# Patient Record
Sex: FEMALE | Race: WHITE | NOT HISPANIC OR LATINO
[De-identification: names, ages, dates, MRNs, and addresses within clinical notes are randomized per-mention and may not be internally consistent; named-entity substitution may affect disease eponyms.]

---
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
## 2021-11-04 ENCOUNTER — OFFICE VISIT (OUTPATIENT)
Dept: OTOLARYNGOLOGY | Facility: CLINIC | Age: 21
End: 2021-11-04
Payer: COMMERCIAL

## 2021-11-04 ENCOUNTER — OFFICE VISIT (OUTPATIENT)
Dept: AUDIOLOGY | Facility: CLINIC | Age: 21
End: 2021-11-04
Payer: COMMERCIAL

## 2021-11-04 VITALS — HEIGHT: 64 IN | WEIGHT: 130 LBS | BODY MASS INDEX: 22.2 KG/M2

## 2021-11-04 DIAGNOSIS — H90.11 CONDUCTIVE HEARING LOSS OF RIGHT EAR WITH UNRESTRICTED HEARING OF LEFT EAR: Primary | ICD-10-CM

## 2021-11-04 DIAGNOSIS — H74.03 TYMPANOSCLEROSIS OF BOTH EARS: ICD-10-CM

## 2021-11-04 PROCEDURE — 69420 INCISION OF EARDRUM: CPT | Performed by: OTOLARYNGOLOGY

## 2021-11-04 PROCEDURE — 92557 COMPREHENSIVE HEARING TEST: CPT | Performed by: AUDIOLOGIST

## 2021-11-04 PROCEDURE — 92567 TYMPANOMETRY: CPT | Performed by: AUDIOLOGIST

## 2021-11-04 PROCEDURE — 99203 OFFICE O/P NEW LOW 30 MIN: CPT | Performed by: OTOLARYNGOLOGY

## 2021-11-04 PROCEDURE — 3008F BODY MASS INDEX DOCD: CPT | Performed by: OTOLARYNGOLOGY

## 2021-11-04 RX ORDER — FLUTICASONE PROPIONATE 50 MCG
2 SPRAY, SUSPENSION (ML) NASAL DAILY
COMMUNITY
Start: 2021-08-10 | End: 2022-08-10

## 2021-11-04 RX ORDER — LEVONORGESTREL / ETHINYL ESTRADIOL AND ETHINYL ESTRADIOL 150-30(84)
KIT ORAL NIGHTLY
COMMUNITY

## 2021-11-04 RX ORDER — SERTRALINE HYDROCHLORIDE 100 MG/1
100 TABLET, FILM COATED ORAL NIGHTLY
COMMUNITY
Start: 2021-10-08

## 2021-11-04 RX ORDER — OFLOXACIN 3 MG/ML
3 SOLUTION AURICULAR (OTIC) 3 TIMES DAILY
Qty: 1 EACH | Refills: 1 | Status: SHIPPED | OUTPATIENT
Start: 2021-11-04 | End: 2021-11-14

## 2021-11-04 RX ORDER — NEOMYCIN SULFATE, POLYMYXIN B SULFATE, HYDROCORTISONE 3.5; 10000; 1 MG/ML; [USP'U]/ML; MG/ML
SOLUTION/ DROPS AURICULAR (OTIC)
COMMUNITY
Start: 2021-09-01

## 2021-11-04 RX ORDER — PREDNISONE 20 MG/1
TABLET ORAL
Qty: 7 TABLET | Refills: 0 | Status: SHIPPED | OUTPATIENT
Start: 2021-11-04 | End: 2021-11-15 | Stop reason: ALTCHOICE

## 2021-11-04 NOTE — PROGRESS NOTES
Feng Torres is a 24year old female.  Patient presents with:  Consult: patient presents with presurre and hearing loss in the right ear,has seen to other ENT's ,was told she had fluid in the ear ,was taking sudafed and flonase ,no help . she now has a b Not on file      Minutes of Exercise per Session: Not on file  Stress:       Feeling of Stress : Not on file  Social Connections:       Frequency of Communication with Friends and Family: Not on file      Frequency of Social Gatherings with Friends and Fam through XII grossly intact. Neck Exam Normal  normal to inspection   Psychiatric Normal   Appropriate mood and affect. Eyes Normal Conjunctiva - Right: Normal, Left: Normal. Pupil - Right: Normal, Left: Normal. EOMI.  JONATHAN   Ears Normal  normal t she agrees to this plan  - OP REFERRAL TO AUDIOLOGY  - INCISION EARDRUM,ASPIR    2. Tympanosclerosis of both ears         This note was partially prepared using [employer] voice recognition dictation software. As a result, errors may occur.  When identi

## 2021-11-11 ENCOUNTER — TELEPHONE (OUTPATIENT)
Dept: OTOLARYNGOLOGY | Facility: CLINIC | Age: 21
End: 2021-11-11

## 2021-11-11 DIAGNOSIS — H65.21 RIGHT CHRONIC SEROUS OTITIS MEDIA: Primary | ICD-10-CM

## 2021-11-11 NOTE — TELEPHONE ENCOUNTER
MD Aileen Ocampo, ANTONY; Yared Epperson  Caller: Unspecified (Today, 11:14 AM)  Schedule right myringotomy with tube in the hospital under general anesthesia operative time 5 minutes and the only thing we need to do preop is a pregnancy t

## 2021-11-11 NOTE — TELEPHONE ENCOUNTER
Patient indicates her ears are bothering her, can not hear out of rt ear and having headaches everyday. Patient indicates she is awaiting to have tubes put in, and indicates she is not sure if she can wait until her appointment 12/3.  Calling to have tubes

## 2021-11-11 NOTE — TELEPHONE ENCOUNTER
Informed patient of note below and informed that our  will patient ,pt verbalized understanding and Becca Avalos was aware.

## 2021-11-11 NOTE — TELEPHONE ENCOUNTER
Patient is scheduled for a procedure with Dr. Suzan Ruiz for 11/17/21 at [address] [place] [address].

## 2021-11-11 NOTE — TELEPHONE ENCOUNTER
Dr Jaron Acosta please see note below, patient stated finished the steroids but did not finish her eardrops as it's causing pain,cannot hear right ear ,only hear crackling sounds and having headache everyday,please advise.

## 2021-11-12 NOTE — H&P
Claudy Goodwin is a 24year old female. No chief complaint on file.         HISTORY OF PRESENT ILLNESS  11/4/2021   Here for evaluation of persistent right-sided hearing loss she states that she has seen 2 ENTs was told that she just had to give [facility] pain, irregular heartbeat   GI Negative Abdominal pain and diarrhea. Endocrine Negative Cold intolerance and heat intolerance. Neuro Negative Tremors.    Psych Negative Behavioral issues   Integumentary Negative Frequent skin infections, pigment change Place 3 drops into the right ear 3 (three) times daily for 10 days. , Disp: 1 each, Rfl: 1  •  predniSONE 20 MG Oral Tab, 2 po for 2d,1 po for 2 d, 1/2po for 2 d, Disp: 7 tablet, Rfl: 0    ASSESSMENT AND PLAN  1.  Conductive hearing loss of right ear with un

## 2021-11-14 ENCOUNTER — LAB ENCOUNTER (OUTPATIENT)
Dept: LAB | Facility: HOSPITAL | Age: 21
End: 2021-11-14
Attending: OTOLARYNGOLOGY
Payer: COMMERCIAL

## 2021-11-14 DIAGNOSIS — Z01.818 PREOPERATIVE TESTING: ICD-10-CM

## 2021-11-16 ENCOUNTER — TELEPHONE (OUTPATIENT)
Dept: OTOLARYNGOLOGY | Facility: CLINIC | Age: 21
End: 2021-11-16

## 2021-11-16 NOTE — TELEPHONE ENCOUNTER
Per Jame Ruiz pt is scheduled for surgery tomorrow and prior Rebyadiraa Beatriz is needed. Thank you.

## 2021-11-17 ENCOUNTER — ANESTHESIA EVENT (OUTPATIENT)
Dept: SURGERY | Facility: HOSPITAL | Age: 21
End: 2021-11-17
Payer: COMMERCIAL

## 2021-11-17 ENCOUNTER — HOSPITAL ENCOUNTER (OUTPATIENT)
Facility: HOSPITAL | Age: 21
Setting detail: HOSPITAL OUTPATIENT SURGERY
Discharge: HOME | End: 2021-11-17
Attending: OTOLARYNGOLOGY | Admitting: OTOLARYNGOLOGY
Payer: COMMERCIAL

## 2021-11-17 ENCOUNTER — ANESTHESIA (OUTPATIENT)
Dept: SURGERY | Facility: HOSPITAL | Age: 21
End: 2021-11-17
Payer: COMMERCIAL

## 2021-11-17 VITALS
WEIGHT: 135 LBS | BODY MASS INDEX: 23.05 KG/M2 | DIASTOLIC BLOOD PRESSURE: 64 MMHG | RESPIRATION RATE: 16 BRPM | SYSTOLIC BLOOD PRESSURE: 113 MMHG | HEART RATE: 79 BPM | HEIGHT: 64 IN | OXYGEN SATURATION: 98 % | TEMPERATURE: 97 F

## 2021-11-17 DIAGNOSIS — Z01.818 PREOPERATIVE TESTING: Primary | ICD-10-CM

## 2021-11-17 DIAGNOSIS — H65.21 RIGHT CHRONIC SEROUS OTITIS MEDIA: ICD-10-CM

## 2021-11-17 PROCEDURE — 69436 CREATE EARDRUM OPENING: CPT | Performed by: OTOLARYNGOLOGY

## 2021-11-17 PROCEDURE — 099570Z DRAINAGE OF RIGHT MIDDLE EAR WITH DRAINAGE DEVICE, VIA NATURAL OR ARTIFICIAL OPENING: ICD-10-PCS | Performed by: OTOLARYNGOLOGY

## 2021-11-17 DEVICE — TUBE VNT T 4.8MM 1.32MM RCHRD: Type: IMPLANTABLE DEVICE | Site: EAR | Status: FUNCTIONAL

## 2021-11-17 RX ORDER — HYDROCODONE BITARTRATE AND ACETAMINOPHEN 5; 325 MG/1; MG/1
1 TABLET ORAL AS NEEDED
Status: DISCONTINUED | OUTPATIENT
Start: 2021-11-17 | End: 2021-11-17

## 2021-11-17 RX ORDER — ONDANSETRON 2 MG/ML
INJECTION INTRAMUSCULAR; INTRAVENOUS AS NEEDED
Status: DISCONTINUED | OUTPATIENT
Start: 2021-11-17 | End: 2021-11-17 | Stop reason: SURG

## 2021-11-17 RX ORDER — DEXAMETHASONE SODIUM PHOSPHATE 4 MG/ML
VIAL (ML) INJECTION AS NEEDED
Status: DISCONTINUED | OUTPATIENT
Start: 2021-11-17 | End: 2021-11-17 | Stop reason: SURG

## 2021-11-17 RX ORDER — SODIUM CHLORIDE, SODIUM LACTATE, POTASSIUM CHLORIDE, CALCIUM CHLORIDE 600; 310; 30; 20 MG/100ML; MG/100ML; MG/100ML; MG/100ML
INJECTION, SOLUTION INTRAVENOUS CONTINUOUS
Status: DISCONTINUED | OUTPATIENT
Start: 2021-11-17 | End: 2021-11-17

## 2021-11-17 RX ORDER — ONDANSETRON 2 MG/ML
4 INJECTION INTRAMUSCULAR; INTRAVENOUS ONCE AS NEEDED
Status: DISCONTINUED | OUTPATIENT
Start: 2021-11-17 | End: 2021-11-17

## 2021-11-17 RX ORDER — ACETAMINOPHEN 500 MG
1000 TABLET ORAL ONCE
Status: COMPLETED | OUTPATIENT
Start: 2021-11-17 | End: 2021-11-17

## 2021-11-17 RX ORDER — HYDROMORPHONE HYDROCHLORIDE 1 MG/ML
0.4 INJECTION, SOLUTION INTRAMUSCULAR; INTRAVENOUS; SUBCUTANEOUS EVERY 5 MIN PRN
Status: DISCONTINUED | OUTPATIENT
Start: 2021-11-17 | End: 2021-11-17

## 2021-11-17 RX ORDER — HYDROCODONE BITARTRATE AND ACETAMINOPHEN 5; 325 MG/1; MG/1
2 TABLET ORAL AS NEEDED
Status: DISCONTINUED | OUTPATIENT
Start: 2021-11-17 | End: 2021-11-17

## 2021-11-17 RX ORDER — NALOXONE HYDROCHLORIDE 0.4 MG/ML
80 INJECTION, SOLUTION INTRAMUSCULAR; INTRAVENOUS; SUBCUTANEOUS AS NEEDED
Status: DISCONTINUED | OUTPATIENT
Start: 2021-11-17 | End: 2021-11-17

## 2021-11-17 RX ORDER — MORPHINE SULFATE 10 MG/ML
6 INJECTION, SOLUTION INTRAMUSCULAR; INTRAVENOUS EVERY 10 MIN PRN
Status: DISCONTINUED | OUTPATIENT
Start: 2021-11-17 | End: 2021-11-17

## 2021-11-17 RX ORDER — NEOMYCIN SULFATE, POLYMYXIN B SULFATE AND HYDROCORTISONE 10; 3.5; 1 MG/ML; MG/ML; [USP'U]/ML
SUSPENSION/ DROPS AURICULAR (OTIC) AS NEEDED
Status: DISCONTINUED | OUTPATIENT
Start: 2021-11-17 | End: 2021-11-17 | Stop reason: HOSPADM

## 2021-11-17 RX ORDER — MORPHINE SULFATE 4 MG/ML
2 INJECTION, SOLUTION INTRAMUSCULAR; INTRAVENOUS EVERY 10 MIN PRN
Status: DISCONTINUED | OUTPATIENT
Start: 2021-11-17 | End: 2021-11-17

## 2021-11-17 RX ORDER — HALOPERIDOL 5 MG/ML
0.25 INJECTION INTRAMUSCULAR ONCE AS NEEDED
Status: DISCONTINUED | OUTPATIENT
Start: 2021-11-17 | End: 2021-11-17

## 2021-11-17 RX ORDER — MORPHINE SULFATE 4 MG/ML
4 INJECTION, SOLUTION INTRAMUSCULAR; INTRAVENOUS EVERY 10 MIN PRN
Status: DISCONTINUED | OUTPATIENT
Start: 2021-11-17 | End: 2021-11-17

## 2021-11-17 RX ORDER — HYDROMORPHONE HYDROCHLORIDE 1 MG/ML
0.6 INJECTION, SOLUTION INTRAMUSCULAR; INTRAVENOUS; SUBCUTANEOUS EVERY 5 MIN PRN
Status: DISCONTINUED | OUTPATIENT
Start: 2021-11-17 | End: 2021-11-17

## 2021-11-17 RX ORDER — PROCHLORPERAZINE EDISYLATE 5 MG/ML
5 INJECTION INTRAMUSCULAR; INTRAVENOUS ONCE AS NEEDED
Status: DISCONTINUED | OUTPATIENT
Start: 2021-11-17 | End: 2021-11-17

## 2021-11-17 RX ORDER — LIDOCAINE HYDROCHLORIDE 10 MG/ML
INJECTION, SOLUTION EPIDURAL; INFILTRATION; INTRACAUDAL; PERINEURAL AS NEEDED
Status: DISCONTINUED | OUTPATIENT
Start: 2021-11-17 | End: 2021-11-17 | Stop reason: SURG

## 2021-11-17 RX ORDER — HYDROMORPHONE HYDROCHLORIDE 1 MG/ML
0.2 INJECTION, SOLUTION INTRAMUSCULAR; INTRAVENOUS; SUBCUTANEOUS EVERY 5 MIN PRN
Status: DISCONTINUED | OUTPATIENT
Start: 2021-11-17 | End: 2021-11-17

## 2021-11-17 RX ADMIN — ONDANSETRON 4 MG: 2 INJECTION INTRAMUSCULAR; INTRAVENOUS at 10:26:00

## 2021-11-17 RX ADMIN — SODIUM CHLORIDE, SODIUM LACTATE, POTASSIUM CHLORIDE, CALCIUM CHLORIDE: 600; 310; 30; 20 INJECTION, SOLUTION INTRAVENOUS at 10:46:00

## 2021-11-17 RX ADMIN — DEXAMETHASONE SODIUM PHOSPHATE 4 MG: 4 MG/ML VIAL (ML) INJECTION at 10:26:00

## 2021-11-17 RX ADMIN — LIDOCAINE HYDROCHLORIDE 50 MG: 10 INJECTION, SOLUTION EPIDURAL; INFILTRATION; INTRACAUDAL; PERINEURAL at 10:26:00

## 2021-11-17 RX ADMIN — SODIUM CHLORIDE, SODIUM LACTATE, POTASSIUM CHLORIDE, CALCIUM CHLORIDE: 600; 310; 30; 20 INJECTION, SOLUTION INTRAVENOUS at 10:26:00

## 2021-11-17 NOTE — ANESTHESIA PREPROCEDURE EVALUATION
Anesthesia PreOp Note    HPI:     Jazmyne Solares is a 24year old female who presents for preoperative consultation requested by: Sobeida Altamirano MD    Date of Surgery: 11/17/2021    Procedure(s):  EAR MYRINGOTOMY TUBE INSERTION  Indication: Right c status: Never Smoker      Smokeless tobacco: Never Used    Vaping Use      Vaping Use: Never used    Substance and Sexual Activity      Alcohol use: Never      Drug use: Never      Sexual activity: Not on file    Other Topics      Concerns:        Not on f anesthetic management. All of the patient's questions were answered to the best of my ability. The patient desires the anesthetic management as planned.   Nini Chery MD  11/17/2021 9:58 AM

## 2021-11-17 NOTE — ANESTHESIA PROCEDURE NOTES
Airway  Date/Time: 11/17/2021 10:24 AM  Urgency: Elective    Airway not difficult    General Information and Staff    Patient location during procedure: OR  Anesthesiologist: Dunia Denson MD  Performed: anesthesiologist     Indications and Patient Conditio

## 2021-11-17 NOTE — ANESTHESIA POSTPROCEDURE EVALUATION
Patient: Keith Wagner    Procedure Summary     Date: 11/17/21 Room / Location: [facility] OR  / [facility] OR    Anesthesia Start: 6393 Anesthesia Stop: 2283    Procedure: EAR MYRINGOTOMY TUBE INSERTION (Right Ear) Diagnosis:       Right chronic se

## 2021-11-17 NOTE — OPERATIVE REPORT
[facility]  DATE OF SURGERY: 11/17/2021  PREOPERATIVE DIAGNOSIS:    Chronic serous otitis media. Eustachian tube dysfunction. POSTOPERATIVE DIAGNOSIS:  Same.   OPERATIVE PROCEDURE:      Right  tympanostomy and tube placement with use of the

## 2021-11-17 NOTE — INTERVAL H&P NOTE
Pre-op Diagnosis: Right chronic serous otitis media [H65.21]    The above referenced H&P was reviewed by Boo Lea MD on 11/17/2021, the patient was examined and no significant changes have occurred in the patient's condition since the H&P was performe

## 2021-11-18 ENCOUNTER — TELEPHONE (OUTPATIENT)
Dept: OTOLARYNGOLOGY | Facility: CLINIC | Age: 21
End: 2021-11-18

## 2021-11-20 ENCOUNTER — TELEPHONE (OUTPATIENT)
Dept: OTOLARYNGOLOGY | Facility: CLINIC | Age: 21
End: 2021-11-20

## 2021-11-22 NOTE — TELEPHONE ENCOUNTER
Pt is post op day 5 myringotomy with tube. Per pt states after using ear drops, pt dark reddish blood tinged drainage from ears, slight pain at times and popping causing hearing issues. Pt asking if normal after surgery.  Advised pt to keep ears dry as reinier

## 2021-11-23 NOTE — TELEPHONE ENCOUNTER
MD Alba Jimenez, RN  Caller: Unspecified (5 days ago, 11:58 AM)  Yes this is normal she had a lot of inflammation when I put the tube in so I am not surprised

## 2021-12-14 ENCOUNTER — OFFICE VISIT (OUTPATIENT)
Dept: OTOLARYNGOLOGY | Facility: CLINIC | Age: 21
End: 2021-12-14
Payer: COMMERCIAL

## 2021-12-14 VITALS — BODY MASS INDEX: 23.05 KG/M2 | WEIGHT: 135 LBS | HEIGHT: 64 IN

## 2021-12-14 DIAGNOSIS — H65.112 ACUTE MUCOID OTITIS MEDIA OF LEFT EAR: Primary | ICD-10-CM

## 2021-12-14 PROCEDURE — 99213 OFFICE O/P EST LOW 20 MIN: CPT | Performed by: OTOLARYNGOLOGY

## 2021-12-14 PROCEDURE — 3008F BODY MASS INDEX DOCD: CPT | Performed by: OTOLARYNGOLOGY

## 2021-12-14 RX ORDER — CLONAZEPAM 0.5 MG/1
TABLET ORAL
COMMUNITY
Start: 2021-12-09

## 2021-12-14 RX ORDER — PAROXETINE 10 MG/1
TABLET, FILM COATED ORAL
COMMUNITY
Start: 2021-12-09

## 2021-12-14 RX ORDER — DEXAMETHASONE 6 MG/1
TABLET ORAL
Qty: 4 TABLET | Refills: 0 | Status: SHIPPED | OUTPATIENT
Start: 2021-12-14

## 2021-12-14 RX ORDER — AMOXICILLIN 500 MG/1
500 CAPSULE ORAL 3 TIMES DAILY
Qty: 21 CAPSULE | Refills: 0 | Status: SHIPPED | OUTPATIENT
Start: 2021-12-14 | End: 2021-12-21

## 2021-12-15 NOTE — PROGRESS NOTES
Demetria Pruett is a 24year old female. Patient presents with:  Ear Problem: Pt says shes having pain in left ear and she hears popping in left ear.          HISTORY OF PRESENT ILLNESS  11/4/2021   Here for evaluation of persistent right-sided heari Diagnosis Date   • Anxiety state    • Back problem    • Depression    • Migraines    • Pneumonia due to organism      Past Surgical History:   Procedure Laterality Date   • OTHER      right breast benign tumor removal   • TONSILLECTOMY           REVIEW O Take 1 capsule (500 mg total) by mouth 3 (three) times daily for 7 days. , Disp: 21 capsule, Rfl: 0  •  sertraline 100 MG Oral Tab, Take 100 mg by mouth at bedtime. , Disp: , Rfl:   •  Levonorgest-Eth Estrad 91-Day 0.15-0.03 &0.01 MG Oral Tab, at bedtime. , D

## 2022-03-31 ENCOUNTER — OFFICE VISIT (OUTPATIENT)
Dept: OTOLARYNGOLOGY | Facility: CLINIC | Age: 22
End: 2022-03-31
Payer: COMMERCIAL

## 2022-03-31 VITALS — TEMPERATURE: 98 F | BODY MASS INDEX: 23.05 KG/M2 | HEIGHT: 64 IN | WEIGHT: 135 LBS

## 2022-03-31 DIAGNOSIS — H69.83 DYSFUNCTION OF BOTH EUSTACHIAN TUBES: Primary | ICD-10-CM

## 2022-03-31 PROCEDURE — 3008F BODY MASS INDEX DOCD: CPT | Performed by: OTOLARYNGOLOGY

## 2022-03-31 PROCEDURE — 99213 OFFICE O/P EST LOW 20 MIN: CPT | Performed by: OTOLARYNGOLOGY

## 2022-03-31 RX ORDER — TRAZODONE HYDROCHLORIDE 50 MG/1
50 TABLET ORAL NIGHTLY
COMMUNITY
Start: 2022-03-14

## 2022-09-19 ENCOUNTER — OFFICE VISIT (OUTPATIENT)
Dept: OTOLARYNGOLOGY | Facility: CLINIC | Age: 22
End: 2022-09-19
Payer: COMMERCIAL

## 2022-09-19 ENCOUNTER — OFFICE VISIT (OUTPATIENT)
Dept: AUDIOLOGY | Facility: CLINIC | Age: 22
End: 2022-09-19
Payer: COMMERCIAL

## 2022-09-19 VITALS — HEIGHT: 64 IN | WEIGHT: 135 LBS | BODY MASS INDEX: 23.05 KG/M2 | TEMPERATURE: 98 F

## 2022-09-19 DIAGNOSIS — H65.92 FLUID LEVEL BEHIND TYMPANIC MEMBRANE OF LEFT EAR: ICD-10-CM

## 2022-09-19 DIAGNOSIS — H91.90 HEARING LOSS, UNSPECIFIED HEARING LOSS TYPE, UNSPECIFIED LATERALITY: Primary | ICD-10-CM

## 2022-09-19 DIAGNOSIS — H90.12 CONDUCTIVE HEARING LOSS OF LEFT EAR WITH UNRESTRICTED HEARING OF RIGHT EAR: ICD-10-CM

## 2022-09-19 DIAGNOSIS — H90.12 CONDUCTIVE HEARING LOSS OF LEFT EAR WITH UNRESTRICTED HEARING OF RIGHT EAR: Primary | ICD-10-CM

## 2022-09-19 PROCEDURE — 92557 COMPREHENSIVE HEARING TEST: CPT | Performed by: AUDIOLOGIST

## 2022-09-19 PROCEDURE — 92567 TYMPANOMETRY: CPT | Performed by: AUDIOLOGIST

## 2022-09-19 RX ORDER — OFLOXACIN 3 MG/ML
SOLUTION/ DROPS OPHTHALMIC
Qty: 5 ML | Refills: 0 | Status: SHIPPED | OUTPATIENT
Start: 2022-09-19

## 2022-09-19 RX ORDER — AMOXICILLIN AND CLAVULANATE POTASSIUM 875; 125 MG/1; MG/1
1 TABLET, FILM COATED ORAL 2 TIMES DAILY
COMMUNITY
Start: 2022-09-13

## 2022-10-10 ENCOUNTER — OFFICE VISIT (OUTPATIENT)
Dept: OTOLARYNGOLOGY | Facility: CLINIC | Age: 22
End: 2022-10-10
Payer: COMMERCIAL

## 2022-10-10 VITALS — BODY MASS INDEX: 23.05 KG/M2 | WEIGHT: 135 LBS | HEIGHT: 64 IN | TEMPERATURE: 97 F

## 2022-10-10 DIAGNOSIS — Z96.22 HISTORY OF PLACEMENT OF EAR TUBES: ICD-10-CM

## 2022-10-10 DIAGNOSIS — H69.90 EUSTACHIAN TUBE DISORDER, UNSPECIFIED LATERALITY: Primary | ICD-10-CM

## 2022-10-25 ENCOUNTER — LAB REQUISITION (OUTPATIENT)
Dept: SURGERY | Age: 22
End: 2022-10-25
Payer: COMMERCIAL

## 2022-10-25 DIAGNOSIS — Z01.818 PREOP EXAMINATION: ICD-10-CM

## 2022-10-26 LAB — SARS-COV-2 RNA RESP QL NAA+PROBE: NOT DETECTED

## 2022-10-27 ENCOUNTER — SURGERY CENTER DOCUMENTATION (OUTPATIENT)
Dept: SURGERY | Age: 22
End: 2022-10-27

## 2022-10-27 PROCEDURE — 69436 CREATE EARDRUM OPENING: CPT | Performed by: STUDENT IN AN ORGANIZED HEALTH CARE EDUCATION/TRAINING PROGRAM

## 2022-10-27 NOTE — PROCEDURES
DATE OF SURGERY: 10/27/22  PREOPERATIVE DIAGNOSIS:     Eustachian tube dysfunction  POSTOPERATIVE DIAGNOSIS: Same. OPERATIVE PROCEDURE:    Left myringotomy and tympanostomy tube insertion   SURGEON: Aaron Manriquez M.D.  (Otolaryngology)  ANESTHESIA:  MAC  Findings: Left ear: Smaller caliber EAC. Slightly retracted and thickened TM. No LIZABETH. PROCEDURE: Patient was taken to the operating room and placed supine on the operating table. MAC anesthesia was induced with propofol. The patient was positioned and draped and time out was performed with all parties in agreement. Microscope used to examine the left ear. Cerumen debrided with a curette and suction. This revealed an intact tympanic membrane. A myringotomy knife was then used to make a radial incision in the anterior inferior quadrant. No fluid was present. A Bobbin ear tube was then inserted with an alligator. Ofloxacin drops were placed and a cotton ball in the conchal bowel. The patient was then handed over to anesthesia who woke her up and returned her to PACU.    Estimated blood loss: Minimal  Implants or drains: 1.14mm Bobbin tubes in left ear  Urine output: Per anesthesia

## 2022-10-28 ENCOUNTER — TELEPHONE (OUTPATIENT)
Dept: OTOLARYNGOLOGY | Facility: CLINIC | Age: 22
End: 2022-10-28

## 2022-10-31 NOTE — TELEPHONE ENCOUNTER
, per pt c/o of pulsatile tinnitus on left ear. Pt denies pain, discharge from left ear, states it feels as if she can hear her \"heart beat\" in her ear, pt asking if this normal post myringotomy/ tube placement?   Please advise

## 2022-10-31 NOTE — TELEPHONE ENCOUNTER
LMTCB- please transger to RN, per , not abnormal to have, may be due to the way she is hearing after tube was placed, should hopefully go away and may take some time

## 2022-11-03 NOTE — TELEPHONE ENCOUNTER
Per pt has sore throat and congestion, pt states no fever. Pt has hx of allergies. Rescheduled for Monday.

## 2022-11-03 NOTE — TELEPHONE ENCOUNTER
Patient would like to reschedule PO appointment scheduled for tomorrow 11/04. States she is sick.   Please advise

## 2022-11-07 ENCOUNTER — OFFICE VISIT (OUTPATIENT)
Dept: OTOLARYNGOLOGY | Facility: CLINIC | Age: 22
End: 2022-11-07
Payer: COMMERCIAL

## 2022-11-07 VITALS — BODY MASS INDEX: 23.05 KG/M2 | HEIGHT: 64 IN | TEMPERATURE: 98 F | WEIGHT: 135 LBS

## 2022-11-07 DIAGNOSIS — H69.90 EUSTACHIAN TUBE DISORDER, UNSPECIFIED LATERALITY: Primary | ICD-10-CM

## 2024-06-04 ENCOUNTER — OFFICE VISIT (OUTPATIENT)
Dept: OTOLARYNGOLOGY | Facility: CLINIC | Age: 24
End: 2024-06-04

## 2024-06-04 VITALS — BODY MASS INDEX: 23.05 KG/M2 | HEIGHT: 64 IN | WEIGHT: 135 LBS

## 2024-06-04 DIAGNOSIS — H65.05 RECURRENT ACUTE SEROUS OTITIS MEDIA OF LEFT EAR: Primary | ICD-10-CM

## 2024-06-04 DIAGNOSIS — H69.90 EUSTACHIAN TUBE DISORDER, UNSPECIFIED LATERALITY: ICD-10-CM

## 2024-06-04 DIAGNOSIS — Z96.22 HISTORY OF PLACEMENT OF EAR TUBES: ICD-10-CM

## 2024-06-04 PROCEDURE — 3008F BODY MASS INDEX DOCD: CPT | Performed by: SPECIALIST

## 2024-06-04 PROCEDURE — 99213 OFFICE O/P EST LOW 20 MIN: CPT | Performed by: SPECIALIST

## 2024-06-04 RX ORDER — LINACLOTIDE 145 UG/1
CAPSULE, GELATIN COATED ORAL
COMMUNITY
Start: 2024-05-29

## 2024-06-04 RX ORDER — OMEPRAZOLE 40 MG/1
40 CAPSULE, DELAYED RELEASE ORAL DAILY
COMMUNITY

## 2024-06-04 RX ORDER — CETIRIZINE HYDROCHLORIDE 10 MG/1
10 TABLET ORAL DAILY
COMMUNITY
Start: 2024-05-29 | End: 2024-06-28

## 2024-06-04 RX ORDER — CEFDINIR 300 MG/1
300 CAPSULE ORAL 2 TIMES DAILY
Qty: 20 CAPSULE | Refills: 0 | Status: SHIPPED | OUTPATIENT
Start: 2024-06-04

## 2024-06-04 RX ORDER — PREDNISONE 20 MG/1
20 TABLET ORAL DAILY
Qty: 3 TABLET | Refills: 0 | Status: SHIPPED | OUTPATIENT
Start: 2024-06-04

## 2024-06-04 RX ORDER — SPIRONOLACTONE 50 MG/1
TABLET, FILM COATED ORAL
COMMUNITY

## 2024-06-04 RX ORDER — DESVENLAFAXINE SUCCINATE 50 MG/1
TABLET, EXTENDED RELEASE ORAL
COMMUNITY
Start: 2023-10-13

## 2024-06-04 RX ORDER — DROSPIRENONE AND ESTETROL 3-14.2(28)
1 KIT ORAL DAILY
COMMUNITY
Start: 2024-05-29

## 2024-06-05 NOTE — PROGRESS NOTES
Sergio Wagner is a 24 year old female.   Chief Complaint   Patient presents with    Ear Problem     Tube came out and ear is bothersome     HPI:   Patient here.  Had her left PE tube fall out about 2 months ago.  Developed middle ear fluid.  Status post PE tube placement about 2 years ago.    Current Outpatient Medications   Medication Sig Dispense Refill    cetirizine 10 MG Oral Tab Take 1 tablet (10 mg total) by mouth daily.      desvenlafaxine ER 50 MG Oral Tablet 24 Hr       NEXTSTELLIS 3-14.2 MG Oral Tab Take 1 tablet by mouth daily.      LINZESS 145 MCG Oral Cap TAKE 1 CAPSULE BY MOUTH ONCE A DAY FOR CONSTIPATION      metFORMIN 500 MG Oral Tab Take 1 tablet twice a day by oral route for 90 days.      Omeprazole 40 MG Oral Capsule Delayed Release Take 1 capsule (40 mg total) by mouth daily.      spironolactone 50 MG Oral Tab Take 1 tablet every day by mouth for 90 days.      predniSONE 20 MG Oral Tab Take 1 tablet (20 mg total) by mouth daily. 3 tablet 0    cefdinir 300 MG Oral Cap Take 1 capsule (300 mg total) by mouth 2 (two) times daily. 20 capsule 0    dexamethasone 0.1 % Ophthalmic Suspension 5 drops to affected ear twice a day x 7 days 1 each 0    ofloxacin 0.3 % Ophthalmic Solution Apply to affected ear 5 drops twice a day x 7 days 5 mL 0    traZODone 50 MG Oral Tab Take 1 tablet (50 mg total) by mouth nightly.      sertraline 100 MG Oral Tab Take 1 tablet (100 mg total) by mouth at bedtime.      Levonorgest-Eth Estrad 91-Day 0.15-0.03 &0.01 MG Oral Tab at bedtime.      amoxicillin clavulanate 875-125 MG Oral Tab Take 1 tablet by mouth 2 (two) times daily. (Patient not taking: Reported on 10/10/2022)      PARoxetine 10 MG Oral Tab  (Patient not taking: Reported on 3/31/2022)      clonazePAM 0.5 MG Oral Tab  (Patient not taking: Reported on 3/31/2022)      dexamethasone 6 MG Oral Tab 1 po daily for 4 days (Patient not taking: Reported on 3/31/2022) 4 tablet 0      Past Medical History:    Anxiety  state    Back problem    Depression    Migraines    Pneumonia due to organism      Social History:  Social History     Socioeconomic History    Marital status: Single   Tobacco Use    Smoking status: Never    Smokeless tobacco: Never   Vaping Use    Vaping status: Never Used   Substance and Sexual Activity    Alcohol use: Never    Drug use: Never     Social Determinants of Health     Food Insecurity: Low Risk  (3/16/2023)    Received from [facility]    Food Insecurity     Have there been times that your food ran out, and you didn't have money to get more?: No     Are there times that you worry that this might happen?: No   Transportation Needs: Low Risk  (3/16/2023)    Received from [facility]    Transportation Needs     Do you have trouble getting transportation to medical appointments?: No     How do you normally get to and from your appointments?: Other        REVIEW OF SYSTEMS:   GENERAL HEALTH: feels well otherwise  GENERAL : denies fever, chills, sweats, weight loss, weight gain  SKIN: denies any unusual skin lesions or rashes  RESPIRATORY: denies shortness of breath with exertion  NEURO: denies headaches    EXAM:   Ht 5' 4\" (1.626 m)   Wt 135 lb (61.2 kg)   BMI 23.17 kg/m²   System Details   Skin Inspection - Normal.   Constitutional Overall appearance - Normal.   Head/Face Facial features - Normal. Eyebrows - Normal. Skull - Normal.   Eyes Conjunctiva - Right: Normal, Left: Normal. Pupil - Right: Normal, Left: Normal.    Ears Inspection - Right: Normal, Left: Normal.   Canal - Right: Normal, Left: Normal.   TM -left: Complete right serous otitis media   right: T-tube in position   Nasal External nose - Normal.   Nasal septum - Normal.  Turbinates - Normal.   Oral/Oropharynx Lips - Normal, Tonsils -absent, Tongue - Normal    Neck Exam Inspection - Normal. Palpation - Normal. Parotid gland - Normal. Thyroid gland - Normal.   Nasopharynx Normal by mirror examination    Lymph Detail Submental. Submandibular. Anterior cervical. Posterior cervical. Supraclavicular all without enlargement   Psychiatric Orientation - Oriented to time, place, person & situation. Appropriate mood and affect.   Neurological Memory - Normal. Cranial nerves - Cranial nerves II through XII grossly intact.     ASSESSMENT AND PLAN:   1. Recurrent acute serous otitis media of left ear  Patient placed on a trial of Omnicef and 3 days of prednisone.  Follow-up in 3 weeks time in the [place] office.  Consider repeat audiogram if not resolved or improved.    2. Eustachian tube disorder, unspecified laterality  PE tubes placed September 2022.  Left 1 is extruded.    3. History of placement of ear tubes  Right PE tube still in position.      The patient indicates understanding of these issues and agrees to the plan.      Nela Cesar MD  6/4/2024  9:20 PM

## 2024-06-05 NOTE — PATIENT INSTRUCTIONS
I placed you on a trial of prednisone and Omnicef for your complete left serous otitis media.  You have a T-tube in the right ear which is patent and in place.  Follow-up in 3 weeks time at the [place] office so an audiogram can be done if the fluid is persistent.

## 2024-07-06 ENCOUNTER — OFFICE VISIT (OUTPATIENT)
Dept: OTOLARYNGOLOGY | Facility: CLINIC | Age: 24
End: 2024-07-06

## 2024-07-06 VITALS — BODY MASS INDEX: 23.05 KG/M2 | WEIGHT: 135 LBS | HEIGHT: 64 IN

## 2024-07-06 DIAGNOSIS — H65.05 RECURRENT ACUTE SEROUS OTITIS MEDIA OF LEFT EAR: Primary | ICD-10-CM

## 2024-07-06 PROCEDURE — 99213 OFFICE O/P EST LOW 20 MIN: CPT | Performed by: OTOLARYNGOLOGY

## 2024-07-06 PROCEDURE — 3008F BODY MASS INDEX DOCD: CPT | Performed by: OTOLARYNGOLOGY

## 2024-07-06 RX ORDER — LORAZEPAM 0.5 MG/1
TABLET ORAL
COMMUNITY

## 2024-07-06 RX ORDER — FLUTICASONE PROPIONATE 50 MCG
SPRAY, SUSPENSION (ML) NASAL
COMMUNITY
Start: 2024-06-25

## 2024-07-06 NOTE — PROGRESS NOTES
Sergio Wagner is a 24 year old female.    Chief Complaint   Patient presents with    Follow - Up     recurrent acute serous otitis media of left ear    Hearing Loss     Difficulty hearing after ear infection       HISTORY OF PRESENT ILLNESS  Previous history of tubes placed.  T-tube placed on the right regular tube on the left.  Tube now out for 6 months on the left side and she has had 3 infections since then.  Now with pain discomfort can hear out of the left ear.  Last T-tube on the right placed about 2 years ago under general anesthesia.  Here for further evaluation and management.      Social History     Socioeconomic History    Marital status: Single   Tobacco Use    Smoking status: Never    Smokeless tobacco: Never   Vaping Use    Vaping status: Never Used   Substance and Sexual Activity    Alcohol use: Never    Drug use: Never       Family History   Problem Relation Age of Onset    Hypertension Father     Cancer Mother         breast , cervical       Past Medical History:    Anxiety state    Back problem    Depression    Migraines    Pneumonia due to organism       Past Surgical History:   Procedure Laterality Date    Other      right breast benign tumor removal    Tonsillectomy           REVIEW OF SYSTEMS    System Neg/Pos Details   Constitutional Negative Fatigue, fever and weight loss.   ENMT Negative Drooling.   Eyes Negative Blurred vision and vision changes.   Respiratory Negative Dyspnea and wheezing.   Cardio Negative Chest pain, irregular heartbeat/palpitations and syncope.   GI Negative Abdominal pain and diarrhea.   Endocrine Negative Cold intolerance and heat intolerance.   Neuro Negative Tremors.   Psych Negative Anxiety and depression.   Integumentary Negative Frequent skin infections, pigment change and rash.   Hema/Lymph Negative Easy bleeding and easy bruising.           PHYSICAL EXAM    Ht 5' 4\" (1.626 m)   Wt 135 lb (61.2 kg)   BMI 23.17 kg/m²        Constitutional Normal  Overall appearance - Normal.   Psychiatric Normal Orientation - Oriented to time, place, person & situation. Appropriate mood and affect.   Neck Exam Normal Inspection - Normal. Palpation - Normal. Parotid gland - Normal. Thyroid gland - Normal.   Eyes Normal Conjunctiva - Right: Normal, Left: Normal. Pupil - Right: Normal, Left: Normal. Fundus - Right: Normal, Left: Normal.   Neurological Normal Memory - Normal. Cranial nerves - Cranial nerves II through XII grossly intact.   Head/Face Normal Facial features - Normal. Eyebrows - Normal. Skull - Normal.        Nasopharynx Normal External nose - Normal. Lips/teeth/gums - Normal. Tonsils - Normal. Oropharynx - Normal.   Ears Normal Inspection - Right: Normal, Left: Normal. Canal - Right: Normal, Left: Normal. TM - Right: T-tube is in place and patent left: Retracted with middle ear fluid   Skin Normal Inspection - Normal.        Lymph Detail Normal Submental. Submandibular. Anterior cervical. Posterior cervical. Supraclavicular.        Nose/Mouth/Throat Normal External nose - Normal. Lips/teeth/gums - Normal. Tonsils - Normal. Oropharynx - Normal.   Nose/Mouth/Throat Normal Nares - Right: Normal Left: Normal. Septum -Normal  Turbinates - Right: Normal, Left: Normal.       Current Outpatient Medications:     fluticasone propionate 50 MCG/ACT Nasal Suspension, INSTILL 1 SPRAY INTO EACH NOSTRIL TWICE DAILY FOR 1 WEEK, Disp: , Rfl:     LORazepam 0.5 MG Oral Tab, TAKE HALF TO ONE TABLET BY MOUTH DAILY AS NEEDED FOR SEVERE ANXIETY OR PANIC ATTACKS, Disp: , Rfl:     vortioxetine (TRINTELLIX) 20 MG Oral Tab, Take 1 tablet (20 mg total) by mouth daily., Disp: , Rfl:     desvenlafaxine ER 50 MG Oral Tablet 24 Hr, , Disp: , Rfl:     NEXTSTELLIS 3-14.2 MG Oral Tab, Take 1 tablet by mouth daily., Disp: , Rfl:     LINZESS 145 MCG Oral Cap, TAKE 1 CAPSULE BY MOUTH ONCE A DAY FOR CONSTIPATION, Disp: , Rfl:     metFORMIN 500 MG Oral Tab, Take 1 tablet twice a day by oral route for 90  days., Disp: , Rfl:     Omeprazole 40 MG Oral Capsule Delayed Release, Take 1 capsule (40 mg total) by mouth daily., Disp: , Rfl:     spironolactone 50 MG Oral Tab, Take 1 tablet every day by mouth for 90 days., Disp: , Rfl:     dexamethasone 0.1 % Ophthalmic Suspension, 5 drops to affected ear twice a day x 7 days, Disp: 1 each, Rfl: 0    ofloxacin 0.3 % Ophthalmic Solution, Apply to affected ear 5 drops twice a day x 7 days, Disp: 5 mL, Rfl: 0    traZODone 50 MG Oral Tab, Take 1 tablet (50 mg total) by mouth nightly., Disp: , Rfl:     sertraline 100 MG Oral Tab, Take 1 tablet (100 mg total) by mouth at bedtime., Disp: , Rfl:     Levonorgest-Eth Estrad 91-Day 0.15-0.03 &0.01 MG Oral Tab, at bedtime., Disp: , Rfl:     predniSONE 20 MG Oral Tab, Take 1 tablet (20 mg total) by mouth daily. (Patient not taking: Reported on 7/6/2024), Disp: 3 tablet, Rfl: 0    cefdinir 300 MG Oral Cap, Take 1 capsule (300 mg total) by mouth 2 (two) times daily. (Patient not taking: Reported on 7/6/2024), Disp: 20 capsule, Rfl: 0    amoxicillin clavulanate 875-125 MG Oral Tab, Take 1 tablet by mouth 2 (two) times daily. (Patient not taking: Reported on 10/10/2022), Disp: , Rfl:     PARoxetine 10 MG Oral Tab, , Disp: , Rfl:     clonazePAM 0.5 MG Oral Tab, , Disp: , Rfl:     dexamethasone 6 MG Oral Tab, 1 po daily for 4 days (Patient not taking: Reported on 3/31/2022), Disp: 4 tablet, Rfl: 0  ASSESSMENT AND PLAN    1. Recurrent acute serous otitis media of left ear  Middle ear fluid on the left.  I did recommend placement of a T-tube as well on the side.  We will do this under general anesthesia.  Risks and benefits discussed with patient and she wishes to proceed.        This note was prepared using [employer] voice recognition dictation software. As a result errors may occur. When identified these errors have been corrected. While every attempt is made to correct errors during dictation discrepancies may still exist    Patricio ENRIQUEZ  MD [place]    7/6/2024    9:37 AM

## 2024-07-08 ENCOUNTER — TELEPHONE (OUTPATIENT)
Dept: OTOLARYNGOLOGY | Facility: CLINIC | Age: 24
End: 2024-07-08

## 2024-07-08 DIAGNOSIS — H65.22 LEFT CHRONIC SEROUS OTITIS MEDIA: Primary | ICD-10-CM

## 2024-07-08 NOTE — PROGRESS NOTES
Patient scheduled for LEFT EAR MYRINGOTOMY T-TUBE INSERTION on 7/24//24 at [facility].

## 2024-07-08 NOTE — TELEPHONE ENCOUNTER
Patient scheduled for LEFT EAR MYRINGOTOMY T-TUBE INSERTION on 7/24//24 at [facility].

## 2024-07-22 PROBLEM — B00.9 HERPES SIMPLEX: Status: ACTIVE | Noted: 2022-06-26

## 2024-07-22 PROBLEM — F32.9 MAJOR DEPRESSIVE DISORDER: Status: ACTIVE | Noted: 2020-08-31

## 2024-07-22 PROBLEM — E55.9 VITAMIN D DEFICIENCY: Status: ACTIVE | Noted: 2023-03-16

## 2024-07-22 PROBLEM — F41.9 ANXIETY DISORDER: Status: ACTIVE | Noted: 2020-08-31

## 2024-07-24 PROBLEM — H69.93 DYSFUNCTION OF BOTH EUSTACHIAN TUBES: Status: RESOLVED | Noted: 2024-07-24 | Resolved: 2024-07-24

## 2024-07-24 PROBLEM — H69.93 DYSFUNCTION OF BOTH EUSTACHIAN TUBES: Status: ACTIVE | Noted: 2024-07-24

## 2024-07-25 ENCOUNTER — TELEPHONE (OUTPATIENT)
Dept: OTOLARYNGOLOGY | Facility: CLINIC | Age: 24
End: 2024-07-25

## 2024-07-25 NOTE — TELEPHONE ENCOUNTER
Pt is post op day 1 - left myringotomy with tube    LVM for patient and also sent her a mychart message she can respond to     Apply eardrops to pt as prescribed  - Ofloxacin    keep ears dry as water can be a source of infection     contact our office if symptoms change or worsen such as bleeding or fever greater than 102     Post op appt: August 1st or 8th    POST PROCEDURE CARE AND INFECTION PREVENTION:  Patients often have an elevated temperature during the first few days following surgery.  If your temperature goes above 102 degrees, notify your doctor.  Do NOT attempt to clean your ears.  There is often a small amount of bloody discharge following surgery.  If there is a large amount of drainage or drainage with an odor, contact your doctor.  Cotton may be worn in the ears but is not necessary.  Do NOT submerge ears/head in water.  When showering keep the ears as dry as possible.  You may resume normal activities on the day following surgery.  Avoid undue fatigue as it may predispose you to a cold.  If you have any questions, please call your doctor's office.  The office phone # is [phone]

## 2024-07-25 NOTE — TELEPHONE ENCOUNTER
Patient with some ear discomfort and muffled hearing. Advised this is common, but will forward to provider so he is aware.     Future Appointments   Date Time Provider Department Center   8/6/2024 11:30 AM Patricio Haywood MD [facility]

## 2024-07-26 NOTE — TELEPHONE ENCOUNTER
Pain could be from the use of the eardrops.  That can also cause muffling of sound.  Finished the drops after 3 days or so and keep the ear nice and dry and I will see her in about 2 weeks

## 2024-07-29 ENCOUNTER — TELEPHONE (OUTPATIENT)
Dept: OTOLARYNGOLOGY | Facility: CLINIC | Age: 24
End: 2024-07-29

## 2024-07-29 NOTE — TELEPHONE ENCOUNTER
Contacted patient and per Dr. Haywood, Ear pain and throat pain not likely due to placement of a tube. something else is likely causing these symptoms. MD would recommend Ibuprofen for her discomfort and MD will see her at her next appointment. She may RTC sooner if she is concerned about these symptoms. Many things can cause her symptoms including jaw joint issues from grinding or clenching of the teeth.  Patient had tried ibuprofen twice yesterday without relief but will take ibuprofen today again and call back if no improvement by tomorrow to be seen by md sooner. Md aware and agrees with plan.

## 2024-07-29 NOTE — TELEPHONE ENCOUNTER
Per patient, is having intermittent sharp pains in left ear, no drainage, left side of throat is also sore, no fever, finished ear drops and has keep ear clean and dry.    Dr. Haywood, please see message and advise.

## 2024-08-06 ENCOUNTER — OFFICE VISIT (OUTPATIENT)
Dept: OTOLARYNGOLOGY | Facility: CLINIC | Age: 24
End: 2024-08-06

## 2024-08-06 DIAGNOSIS — H65.05 RECURRENT ACUTE SEROUS OTITIS MEDIA OF LEFT EAR: Primary | ICD-10-CM

## 2024-08-06 DIAGNOSIS — H69.90 EUSTACHIAN TUBE DISORDER, UNSPECIFIED LATERALITY: ICD-10-CM

## 2024-08-06 PROCEDURE — 99213 OFFICE O/P EST LOW 20 MIN: CPT | Performed by: OTOLARYNGOLOGY

## 2024-08-06 NOTE — PROGRESS NOTES
Sergio Wagner is a 24 year old female.    Chief Complaint   Patient presents with    Post-Op     Patient is here due to LEFT EAR MYRINGOTOMY T-TUBE INSERTION        HISTORY OF PRESENT ILLNESS  Previous history of tubes placed.  T-tube placed on the right regular tube on the left.  Tube now out for 6 months on the left side and she has had 3 infections since then.  Now with pain discomfort can hear out of the left ear.  Last T-tube on the right placed about 2 years ago under general anesthesia.  Here for further evaluation and management.     8/6/24 several weeks out from placement of tubes bilaterally.  Doing very well hearing better no complaints or concerns at this time other than occasional pain or discomfort.  Has jaw issues and will require orthognathic surgery in the future..  Significant overbite      Social History     Socioeconomic History    Marital status: Single   Tobacco Use    Smoking status: Never    Smokeless tobacco: Never   Vaping Use    Vaping status: Never Used   Substance and Sexual Activity    Alcohol use: Not Currently     Comment: socially    Drug use: Never       Family History   Problem Relation Age of Onset    Hypertension Father     Cancer Mother         breast , cervical       Past Medical History:    Anxiety state    Back problem    Depression    Migraines    Pneumonia due to organism       Past Surgical History:   Procedure Laterality Date    Colonoscopy      Create eardrum opening,gen anesth      Myringotomy, laser-assisted  07/24/2024    Other      right breast benign tumor removal    Tonsillectomy           REVIEW OF SYSTEMS    System Neg/Pos Details   Constitutional Negative Fatigue, fever and weight loss.   ENMT Negative Drooling.   Eyes Negative Blurred vision and vision changes.   Respiratory Negative Dyspnea and wheezing.   Cardio Negative Chest pain, irregular heartbeat/palpitations and syncope.   GI Negative Abdominal pain and diarrhea.   Endocrine Negative Cold  intolerance and heat intolerance.   Neuro Negative Tremors.   Psych Negative Anxiety and depression.   Integumentary Negative Frequent skin infections, pigment change and rash.   Hema/Lymph Negative Easy bleeding and easy bruising.           PHYSICAL EXAM    [facility] 07/15/2024        Constitutional Normal Overall appearance - Normal.   Psychiatric Normal Orientation - Oriented to time, place, person & situation. Appropriate mood and affect.   Neck Exam Normal Inspection - Normal. Palpation - Normal. Parotid gland - Normal. Thyroid gland - Normal.   Eyes Normal Conjunctiva - Right: Normal, Left: Normal. Pupil - Right: Normal, Left: Normal. Fundus - Right: Normal, Left: Normal.   Neurological Normal Memory - Normal. Cranial nerves - Cranial nerves II through XII grossly intact.   Head/Face Normal Facial features - Normal. Eyebrows - Normal. Skull - Normal.        Nasopharynx Normal External nose - Normal. Lips/teeth/gums - Normal. Tonsils - Normal. Oropharynx - Normal.   Ears Normal Inspection - Right: Normal, Left: Normal. Canal - Right: Normal, Left: Normal. TM - Right: Normal, Left: Normal.  T tubes are in place and patent bilaterally   Skin Normal Inspection - Normal.        Lymph Detail Normal Submental. Submandibular. Anterior cervical. Posterior cervical. Supraclavicular.   TMJ  Tender to palpation on the left   Nose/Mouth/Throat Normal External nose - Normal. Lips/teeth/gums - Normal. Tonsils - Normal. Oropharynx - Normal.   Nose/Mouth/Throat Normal Nares - Right: Normal Left: Normal. Septum -Normal  Turbinates - Right: Normal, Left: Normal.       Current Outpatient Medications:     ofloxacin 0.3 % Otic Solution, Place 3 drops in ear(s) in the morning, at noon, and at bedtime., Disp: , Rfl:     desvenlafaxine ER 50 MG Oral Tablet 24 Hr, , Disp: , Rfl:     metFORMIN 500 MG Oral Tab, Take 1 tablet twice a day by oral route for 90 days., Disp: , Rfl:     spironolactone 50 MG Oral Tab, Take 1 tablet every day by mouth  for 90 days., Disp: , Rfl:   ASSESSMENT AND PLAN    1. Recurrent acute serous otitis media of left ear    2. Eustachian tube disorder, unspecified laterality  Hearing better having occasional pain but this appears to be TMJ related as she has tenderness of her TMJ on the left and has a known jaw history where she will require orthognathic surgery in the future.  Strict water precautions discussed and understood return to see me in 6 months for routine tube check.        This note was prepared using [employer] voice recognition dictation software. As a result errors may occur. When identified these errors have been corrected. While every attempt is made to correct errors during dictation discrepancies may still exist    Patricio Haywood MD    8/6/2024    10:41 AM

## 2025-03-25 ENCOUNTER — APPOINTMENT (OUTPATIENT)
Dept: URBAN - METROPOLITAN AREA CLINIC 240 | Age: 25
Setting detail: DERMATOLOGY
End: 2025-03-27

## 2025-03-25 DIAGNOSIS — Z71.89 OTHER SPECIFIED COUNSELING: ICD-10-CM

## 2025-03-25 DIAGNOSIS — D22 MELANOCYTIC NEVI: ICD-10-CM

## 2025-03-25 PROBLEM — D22.4 MELANOCYTIC NEVI OF SCALP AND NECK: Status: ACTIVE | Noted: 2025-03-25

## 2025-03-25 PROCEDURE — OTHER OBSERVATION: OTHER

## 2025-03-25 PROCEDURE — OTHER COUNSELING: OTHER

## 2025-03-25 PROCEDURE — OTHER PHOTO-DOCUMENTATION: OTHER

## 2025-03-25 ASSESSMENT — LOCATION SIMPLE DESCRIPTION DERM: LOCATION SIMPLE: POSTERIOR SCALP

## 2025-03-25 ASSESSMENT — LOCATION DETAILED DESCRIPTION DERM: LOCATION DETAILED: LEFT INFERIOR OCCIPITAL SCALP

## 2025-03-25 ASSESSMENT — LOCATION ZONE DERM: LOCATION ZONE: SCALP

## (undated) DEVICE — COTTON BALLS: Brand: DEROYAL

## (undated) DEVICE — GAMMEX® NON-LATEX PI ORTHO SIZE 6.5, STERILE POLYISOPRENE POWDER-FREE SURGICAL GLOVE: Brand: GAMMEX

## (undated) DEVICE — MEDI-VAC NON-CONDUCTIVE SUCTION TUBING: Brand: CARDINAL HEALTH

## (undated) DEVICE — SOL  .9 1000ML BTL

## (undated) DEVICE — SUCTION CANISTER, 3000CC,SAFELINER: Brand: DEROYAL

## (undated) DEVICE — INTEGRA® KNIFE 1411000 10PK MYRINGOTOMY SPEAR: Brand: INTEGRA®

## (undated) DEVICE — TOWEL SURG OR 17X30IN BLUE

## (undated) NOTE — LETTER
AUTHORIZATION FOR SURGICAL OPERATION OR OTHER PROCEDURE    1.  I hereby authorize Dr. Alejo Perdue, and [facility] staff assigned to my case to perform the following operation and/or procedure at the [facility]:  Right Ear myringotomy and tub Time:  ________ A. M.  P.M.        Patient Name:  ______________________________________________________  (please print)      Patient signature:  ___________________________________________________             Relationship to Patient:           []  Parent

## (undated) NOTE — LETTER
AUTHORIZATION FOR SURGICAL OPERATION OR OTHER PROCEDURE    1. I hereby authorize Dr. Anahi Gaffney , and [address] [place] [address] staff assigned to my case to perform the following operation and/or procedure at the [address] [place] [address]:    Left Myringotomy       2. My physician has explained the nature and purpose of the operation or other procedure, possible alternative methods of treatment, the risks involved, and the possibility of complication to me. I acknowledge that no guarantee has been made as to the result that may be obtained. 3.  I recognize that, during the course of this operation, or other procedure, unforseen conditions may necessitate additional or different procedure than those listed above. I, therefore, further authorize and request that the above named physician, his/her physician assistants or designees perform such procedures as are, in his/her professional opinion, necessary and desirable. 4.  Any tissue or organs removed in the operation or other procedure may be disposed of by and at the discretion of the [address] [place] [address] and [facility]. 5.  I understand that in the event of a medical emergency, I will be transported by local paramedics to [facility] or other hospital emergency department. 6.  I certify that I have read and fully understand the above consent to operation and/or other procedure. 7.  I acknowledge that my physician has explained sedation/analgesia administration to me including the risks and benefits. I consent to the administration of sedation/analgesia as may be necessary or desirable in the judgement of my physician. Witness signature: ___________________________________________________ Date:  ______/______/_____                    Time:  ________ A. M.  P.M.        Patient Name:  ______________________________________________________  (please print)      Patient signature: ___________________________________________________             Relationship to Patient:           []  Parent    Responsible person                          []  Spouse  In case of minor or                    [] Other  _____________   Incompetent name:  __________________________________________________                               (please print)      _____________      Responsible person  In case of minor or  Incompetent signature:  _______________________________________________    Statement of Physician  My signature below affirms that prior to the time of the procedure, I have explained to the patient and/or his/her guardian, the risks and benefits involved in the proposed treatment and any reasonable alternative to the proposed treatment. I have also explained the risks and benefits involved in the refusal of the proposed treatment and have answered the patient's questions.                         Date:  ______/______/_______  Provider                      Signature:  __________________________________________________________       Time:  ___________ A.M    P.M.

## (undated) NOTE — LETTER
[address] [place] [address]      Authorization for Surgical Operation and Procedure     Date:___________                                                                                                         Time:_______ potential risks that can occur: fever and allergic reactions, hemolytic reactions, transmission of diseases such as Hepatitis, AIDS and Cytomegalovirus (CMV) and fluid overload.   In the event that I wish to have an autologous transfusion of my own blood, o will determine when the applicable recovery period ends for purposes of reinstating the DNAR order.   10. Patients having a sterilization procedure: I understand that if the procedure is successful the results will be permanent and it will therefore be impo _______________________________________________________________ _____________________________  [facility] amanda Physician)                                                                                         (Date)                                   (Ti